# Patient Record
Sex: MALE | Race: AMERICAN INDIAN OR ALASKA NATIVE
[De-identification: names, ages, dates, MRNs, and addresses within clinical notes are randomized per-mention and may not be internally consistent; named-entity substitution may affect disease eponyms.]

---

## 2017-09-06 ENCOUNTER — HOSPITAL ENCOUNTER (OUTPATIENT)
Dept: HOSPITAL 31 - C.SPRAD | Age: 57
Discharge: HOME | End: 2017-09-06
Attending: SURGERY
Payer: MEDICARE

## 2017-09-06 VITALS
RESPIRATION RATE: 18 BRPM | HEART RATE: 58 BPM | TEMPERATURE: 97.9 F | OXYGEN SATURATION: 99 % | DIASTOLIC BLOOD PRESSURE: 72 MMHG | SYSTOLIC BLOOD PRESSURE: 110 MMHG

## 2017-09-06 VITALS — BODY MASS INDEX: 25.3 KG/M2

## 2017-09-06 DIAGNOSIS — I73.9: Primary | ICD-10-CM

## 2017-09-06 PROCEDURE — 36246 INS CATH ABD/L-EXT ART 2ND: CPT

## 2017-09-06 NOTE — PCM.SURG1
Surgeon's Initial Post Op Note





- Surgeon's Notes


Surgeon: deidra


Assistant: hector


Type of Anesthesia: IV Sedation


Anesthesia Administered By: pranav


Pre-Operative Diagnosis: claudication right leg


Operative Findings: severe popliteal and tibial disease/ sfa ptfe graft open.  

right profunda occluded.  major run off is via posterior tibial to foot


Post-Operative Diagnosis: same


Operation Performed: aortofemoral angiogram via left groin with selective 

catherization of right femoral artery


Specimen/Specimens Removed: 0


Estimated Blood Loss: EBL {In ML}: 15


Blood Products Given: N/A


Drains Used: No Drains


Post-Op Condition: Good


Date of Surgery/Procedure: 09/06/17


Time of Surgery/Procedure: 09:32

## 2017-09-06 NOTE — OP
DATE:  09/06/2017



PREOPERATIVE DIAGNOSIS:  Claudication, right leg.



POSTOPERATIVE DIAGNOSIS:  Claudication, right leg.



PROCEDURE CARRIED OUT:  Aortofemoral angiogram via left groin with

selective catheterization of right femoral artery.  No intervention.



SURGEON:  Marco Lew Jr., MD



ASSISTANT:  Dr. Christian.



ANESTHESIOLOGIST:  Mr. Hussein Bailey CRNA.



INDICATIONS:  The patient is a 57-year-old man with history of previous

failed interventions in groin, and then subsequently a PTFE bypass in his

common femoral to the above-knee superficial femoral artery.  He was seen

for increasing claudication in the right leg.



OPERATIVE FINDINGS:  The aorta and renal arteries were free of significant

occlusive disease.  Both common, internal, and external iliac arteries were

free of significant occlusive disease.  On the left side, the superficial

femoral artery was widely patent, profunda femoris was patent, the

popliteal was patent and all 3 tibial vessels were seen down to the level

of the foot.  On the right side, there was a high-grade stenosis of over

80% approximately 4 inches below the previous PTFE graft with some

collaterals coming off, below this the popliteal was occluded.  The

anterior tibial reconstituted below the trifurcation and the posterior

tibial artery which is the major vessel and it is quite reconstituted in

the mid-calf.  The dorsalis pedis also reconstituted via collaterals.



Subsequent to the performance, initially we then advanced the catheter over

the aortic bifurcation positioning the catheter in the proximal portion of

the graft, we did take more detailed films that showed the above mentioned

findings.  Catheter was then removed from the left groin, the pressure was

applied.  No closure device was used.  Blood loss in the procedure 15 mL. 

Operation carried out, aortofemoral angiogram, the left via left groin with

selective catheterization of the right femoral artery.  No intervention

undertaken.  Treatment will be monitoring and observation, there are no

plans for any surgical intervention at this time for the level of symptoms

that the patient has with chest claudication.







__________________________________________

Marco Lew Jr., MD



DD:  09/06/2017 9:47:00

DT:  09/06/2017 12:09:12

Job # [id number]

## 2017-09-06 NOTE — PCM.SURG1
Surgeon's Initial Post Op Note





- Surgeon's Notes


Surgeon: Dr. Lew 


Assistant: Yvette Lewis PGY2


Type of Anesthesia: Local


Pre-Operative Diagnosis: R leg claudication


Operative Findings: R SFA stenosis, extensive collateral arteries. Patent R DP.


Post-Operative Diagnosis: R leg claudication


Operation Performed: LE angiography


Specimen/Specimens Removed: none


Estimated Blood Loss: EBL {In ML}: 10


Blood Products Given: N/A


Drains Used: No Drains


Post-Op Condition: Good


Date of Surgery/Procedure: 09/06/17


Time of Surgery/Procedure: 09:30

## 2019-01-11 ENCOUNTER — HOSPITAL ENCOUNTER (EMERGENCY)
Dept: HOSPITAL 31 - C.ER | Age: 59
Discharge: HOME | End: 2019-01-11
Payer: MEDICARE

## 2019-01-11 VITALS
DIASTOLIC BLOOD PRESSURE: 86 MMHG | OXYGEN SATURATION: 98 % | HEART RATE: 78 BPM | RESPIRATION RATE: 20 BRPM | SYSTOLIC BLOOD PRESSURE: 131 MMHG

## 2019-01-11 VITALS — BODY MASS INDEX: 25.3 KG/M2

## 2019-01-11 VITALS — TEMPERATURE: 98 F

## 2019-01-11 DIAGNOSIS — S20.219A: Primary | ICD-10-CM
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
## 2019-01-11 NOTE — C.PDOC
History Of Present Illness


Patient presents to the ER with a complaint of right chest wall pain after he 

tripped and fell down the stairs yesterday and hurt his right chest. He is 

currently speaking in complete sentences. Denies fever, chills, nausea, or 

vomiting.


Time Seen by Provider: 01/11/19 19:27


Chief Complaint (Nursing): Rib Injury


History Per: Patient


History/Exam Limitations: no limitations


Onset/Duration Of Symptoms: Days


Current Symptoms Are (Timing): Still Present


Severity: Moderate


Pain Scale Rating Of: 5


Recent travel outside of the [place]: No





Past Medical History


Reviewed: Historical Data, Nursing Documentation, Vital Signs


Vital Signs: 





                                Last Vital Signs











Temp  98 F   01/11/19 18:27


 


Pulse  65   01/11/19 18:27


 


Resp  16   01/11/19 18:27


 


BP  137/89   01/11/19 18:27


 


Pulse Ox  97   01/11/19 18:27














- Medical History


PMH: COPD, Deep Vein Thrombosis, HTN


   Denies: Chronic Kidney Disease


Surgical History: Appendectomy





- [facility] Procedures











CONTRAST AORTOGRAM (09/05/14)


CONTRAST ARTERIOGRAM NEC (12/17/13)


CONTRAST ARTERIOGRAM-LEG (09/05/14)


OTHER ENDOVASCULAR PROCEDURES ON OTHER VESSELS (09/26/13)








Family History: States: No Known Family Hx





- Social History


Hx Tobacco Use: Yes


Hx Alcohol Use: No


Hx Substance Use: No





- Immunization History


Hx Tetanus Toxoid Vaccination: Yes


Hx Influenza Vaccination: No


Hx Pneumococcal Vaccination: No





Review Of Systems


Constitutional: Negative for: Fever, Chills


Cardiovascular: Negative for: Chest Pain, Palpitations


Respiratory: Negative for: Cough, Shortness of Breath


Gastrointestinal: Negative for: Nausea, Vomiting


Musculoskeletal: Positive for: Other (Right chest wall pain)


Neurological: Negative for: Weakness, Numbness





Physical Exam





- Physical Exam


Appears: Non-toxic


Skin: Warm, Dry


Head: Normacephalic


Oral Mucosa: Moist


Neck: Trachea Midline, Supple


Chest: Tenderness (Mid axillary line, no crepitus), Other (CABG)


Cardiovascular: Rhythm Regular


Respiratory: No Rales, No Rhonchi, No Wheezing


Gastrointestinal/Abdominal: Soft, No Tenderness


Neurological/Psych: Oriented x3


Gait: Steady





ED Course And Treatment


O2 Sat by Pulse Oximetry: 97 (Room air)


Pulse Ox Interpretation: Normal


Progress Note: CT chest ordered.


Reevaluation Time: 21:40


Reassessment Condition: Improved





Medical Decision Making


Medical Decision Making: 





Upon provider reevaluation patient is feeling better, is medically stable, and 

requires no further treatment in the ED at this time. Patient will be discharged

home with Rx for  motrin. Counseling was provided and all questions were 

answered regarding diagnosis and need for follow up with dr dorantes. There is 

agreement to discharge plan. Return if symptoms persist or worsen.





Disposition


Counseled Patient/Family Regarding: Studies Performed, Diagnosis, Need For 

Followup





- Disposition


Referrals: 


Oliver Dorantes MD [Staff Provider] - 


Disposition: HOME/ ROUTINE


Disposition Time: 19:32


Condition: FAIR


Additional Instructions: 


Please return if symptoms recur


Instructions:  Bruised Rib (DC)


Forms:  [facility] (English)





- Clinical Impression


Clinical Impression: 


 Bruised ribs








- Scribe Statement


The provider has reviewed the documentation as recorded by the Scribelda Acosta





All medical record entries made by the Scribe were at my direction and 

personally dictated by me. I have reviewed the chart and agree that the record 

accurately reflects my personal performance of the history, physical exam, 

medical decision making, and the department course for this patient. I have also

personally directed, reviewed, and agree with the discharge instructions and 

disposition.

## 2019-01-12 NOTE — CT
Date of service: 



01/11/2019



PROCEDURE:  CT Chest without contrast



HISTORY:

fall, r rib pain,



COMPARISON:

None available.



TECHNIQUE:

Contiguous axial images were obtained through the chest without 

intravenous contrast enhancement. Sagittal and coronal 

reconstructions were performed.







Radiation dose:



Total exam DLP = 627.67 mGy-cm.



This CT exam was performed using one or more of the following dose 

reduction techniques: Automated exposure control, adjustment of the 

mA and/or kV according to patient size, and/or use of iterative 

reconstruction technique.



FINDINGS:



LUNGS:

 The lungs are well inflated.  There is multifocal patchy 

ground-glass attenuation in the right upper lobe.  There are few 

scattered small nodules in the lungs predominantly in the upper lobes,

 the largest subpleural nodule in the right upper lobe measures 4 mm. 

 (Series 3, image 35). 



There is paraseptal emphysema in the upper lobes.  There is scattered 

centrilobular emphysema in the lungs.  There are no endobronchial 

lesions.  There is bibasilar subsegmental atelectasis.



MEDIASTINUM:

Unremarkable thoracic aorta. No aneurysm. Mild cardiomegaly.  Status 

post CABG.  Main pulmonary artery unremarkable. No vascular 

congestion. There are calcified right hilar lymph nodes.  No 

pathologic adenopathy. 



There are aortic atherosclerotic calcifications.



PLEURA:

No pleural fluid. No pneumothorax.



BONES:

No acute fracture.  No destructive lesion. 



UPPER ABDOMEN:

Cortical scarring in both kidneys and small right kidney.  Small 

nonobstructing stones in both kidneys.  Simple cyst in the upper pole 

of the left kidney.



OTHER FINDINGS:

There is a small sliding hiatal.



IMPRESSION:

1.  No acute findings.



2.  Small scattered nodules in the lungs, the largest subpleural 

nodule in the right upper lobe measures 4 mm.  Findings may be 

infectious or inflammatory in etiology.  Metastatic nodules cannot be 

entirely excluded.  However given presence of calcified right hilar 

lymph nodes there likely post inflammatory in etiology.  Follow-up CT 

scan in 6-12 month interval is recommended to assess stability of 

these nodules. 



3.  Multifocal patchy ground-glass attenuation in the right upper 

lobe may represent nonspecific infection/inflammation. 



A preliminary report was provided by [facility].